# Patient Record
Sex: MALE | Race: WHITE
[De-identification: names, ages, dates, MRNs, and addresses within clinical notes are randomized per-mention and may not be internally consistent; named-entity substitution may affect disease eponyms.]

---

## 2019-02-07 ENCOUNTER — HOSPITAL ENCOUNTER (EMERGENCY)
Dept: HOSPITAL 26 - MED | Age: 24
Discharge: HOME | End: 2019-02-07
Payer: COMMERCIAL

## 2019-02-07 VITALS — BODY MASS INDEX: 37.1 KG/M2 | HEIGHT: 71 IN | WEIGHT: 265 LBS

## 2019-02-07 VITALS — SYSTOLIC BLOOD PRESSURE: 129 MMHG | DIASTOLIC BLOOD PRESSURE: 85 MMHG

## 2019-02-07 VITALS — DIASTOLIC BLOOD PRESSURE: 85 MMHG | SYSTOLIC BLOOD PRESSURE: 129 MMHG
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
## 2019-02-07 DIAGNOSIS — S61.217A: ICD-10-CM
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
## 2019-02-07 DIAGNOSIS — S61.215A: Primary | ICD-10-CM

## 2019-02-07 PROCEDURE — 12001 RPR S/N/AX/GEN/TRNK 2.5CM/<: CPT

## 2019-02-07 PROCEDURE — 73140 X-RAY EXAM OF FINGER(S): CPT

## 2019-02-07 PROCEDURE — 99283 EMERGENCY DEPT VISIT LOW MDM: CPT

## 2019-02-07 NOTE — NUR
PT TO ED WITH C/O LACERATION TO LEFT 4TH AND 5TH FINGER. BLEEDING CONTROLLED. 
+ROM. +CMS. PT PLACED INTO BED, PENDING MD ORTEGA. LAST TDAP-APPROX 6 YEARS AGO. 



PMH--DENIES

RX--DENIES

## 2019-02-07 NOTE — NUR
Patient discharged with v/s stable. Written and verbal after care instructions 
given and explained. 

Patient alert, oriented and verbalized understanding of instructions. 
Ambulatory with steady gait. All questions addressed prior to discharge. ID 
band removed. Patient advised to follow up with PMD. Rx of IBUPROFEN  given. 
Patient educated on indication of medication including possible reaction and 
side effects. Opportunity to ask questions provided and answered.